# Patient Record
Sex: MALE | Race: WHITE
[De-identification: names, ages, dates, MRNs, and addresses within clinical notes are randomized per-mention and may not be internally consistent; named-entity substitution may affect disease eponyms.]

---

## 2018-05-25 ENCOUNTER — HOSPITAL ENCOUNTER (OUTPATIENT)
Dept: HOSPITAL 75 - RAD | Age: 78
End: 2018-05-25
Attending: UROLOGY
Payer: MEDICARE

## 2018-05-25 DIAGNOSIS — K80.20: ICD-10-CM

## 2018-05-25 DIAGNOSIS — N20.0: Primary | ICD-10-CM

## 2018-05-25 DIAGNOSIS — K57.30: ICD-10-CM

## 2018-05-25 DIAGNOSIS — K40.90: ICD-10-CM

## 2018-05-25 PROCEDURE — 74176 CT ABD & PELVIS W/O CONTRAST: CPT

## 2018-05-25 NOTE — DIAGNOSTIC IMAGING REPORT
PROCEDURE: CT abdomen and pelvis without contrast.



TECHNIQUE: Multiple contiguous axial images were obtained through

the abdomen and pelvis without the use of intravenous contrast. 



INDICATION: Hematuria.



No prior studies are available for comparison.



The lung bases are clear.



No discrete liver mass is identified. There appears to be a very

small stone within the gallbladder. Pancreas and spleen are

unremarkable. No adrenal mass is detected. There is a 3 mm

nonobstructing calculus upper pole right kidney. Tiny cortical

calcification left kidney seen. There is no hydronephrosis. No

ureteral calculi or ureteral dilatation is detected. No bladder

calculi are identified. The aorta is heavily calcified but

nonaneurysmal. The small and large bowel loops are normal

caliber. The appendix is unremarkable. There is sigmoid

diverticulosis without evidence of acute diverticulitis. Prostate

is moderately enlarged. There is a small amount of gas within the

bladder, perhaps owing to recent instrumentation. There is a

fat-containing left inguinal hernia. Bony structures are

nonacute.



IMPRESSION:

1. Small nonobstructing right renal calculus. 

2. Cholelithiasis. 

3. Uncomplicated diverticulosis. 

4. Minimal gas within the bladder, perhaps owing to

instrumentation. Clinical correlation is recommended. 

5. Fat containing left inguinal hernia.



Dictated by: 



  Dictated on workstation # [id number]

## 2020-01-17 ENCOUNTER — HOSPITAL ENCOUNTER (OUTPATIENT)
Dept: HOSPITAL 75 - LAB | Age: 80
End: 2020-01-17
Attending: NURSE PRACTITIONER
Payer: MEDICARE

## 2020-01-17 DIAGNOSIS — I25.10: ICD-10-CM

## 2020-01-17 DIAGNOSIS — R53.83: ICD-10-CM

## 2020-01-17 DIAGNOSIS — R43.9: ICD-10-CM

## 2020-01-17 DIAGNOSIS — D64.9: Primary | ICD-10-CM

## 2020-01-17 LAB
ALBUMIN SERPL-MCNC: 3.8 GM/DL (ref 3.2–4.5)
ALP SERPL-CCNC: 89 U/L (ref 40–136)
ALT SERPL-CCNC: 104 U/L (ref 0–55)
BASOPHILS # BLD AUTO: 0 10^3/UL (ref 0–0.1)
BASOPHILS NFR BLD AUTO: 0 % (ref 0–10)
BILIRUB SERPL-MCNC: 0.3 MG/DL (ref 0.1–1)
BUN/CREAT SERPL: 11
CALCIUM SERPL-MCNC: 8.8 MG/DL (ref 8.5–10.1)
CHLORIDE SERPL-SCNC: 106 MMOL/L (ref 98–107)
CO2 SERPL-SCNC: 25 MMOL/L (ref 21–32)
CREAT SERPL-MCNC: 1.67 MG/DL (ref 0.6–1.3)
EOSINOPHIL # BLD AUTO: 0.1 10^3/UL (ref 0–0.3)
EOSINOPHIL NFR BLD AUTO: 1 % (ref 0–10)
ERYTHROCYTE [DISTWIDTH] IN BLOOD BY AUTOMATED COUNT: 22.5 % (ref 10–14.5)
GFR SERPLBLD BASED ON 1.73 SQ M-ARVRAT: 40 ML/MIN
GLUCOSE SERPL-MCNC: 97 MG/DL (ref 70–105)
HCT VFR BLD CALC: 39 % (ref 40–54)
HGB BLD-MCNC: 11.9 G/DL (ref 13.3–17.7)
LYMPHOCYTES # BLD AUTO: 1.8 X 10^3 (ref 1–4)
LYMPHOCYTES NFR BLD AUTO: 24 % (ref 12–44)
MANUAL DIFFERENTIAL PERFORMED BLD QL: NO
MCH RBC QN AUTO: 24 PG (ref 25–34)
MCHC RBC AUTO-ENTMCNC: 31 G/DL (ref 32–36)
MCV RBC AUTO: 79 FL (ref 80–99)
MONOCYTES # BLD AUTO: 0.8 X 10^3 (ref 0–1)
MONOCYTES NFR BLD AUTO: 11 % (ref 0–12)
NEUTROPHILS # BLD AUTO: 4.8 X 10^3 (ref 1.8–7.8)
NEUTROPHILS NFR BLD AUTO: 64 % (ref 42–75)
PLATELET # BLD: 274 10^3/UL (ref 130–400)
PMV BLD AUTO: 11.3 FL (ref 7.4–10.4)
POTASSIUM SERPL-SCNC: 4.8 MMOL/L (ref 3.6–5)
PROT SERPL-MCNC: 7.1 GM/DL (ref 6.4–8.2)
SODIUM SERPL-SCNC: 140 MMOL/L (ref 135–145)
WBC # BLD AUTO: 7.5 10^3/UL (ref 4.3–11)

## 2020-01-17 PROCEDURE — 36415 COLL VENOUS BLD VENIPUNCTURE: CPT

## 2020-01-17 PROCEDURE — 85379 FIBRIN DEGRADATION QUANT: CPT

## 2020-01-17 PROCEDURE — 86141 C-REACTIVE PROTEIN HS: CPT

## 2020-01-17 PROCEDURE — 80053 COMPREHEN METABOLIC PANEL: CPT

## 2020-01-17 PROCEDURE — 85025 COMPLETE CBC W/AUTO DIFF WBC: CPT

## 2020-01-17 PROCEDURE — 82728 ASSAY OF FERRITIN: CPT

## 2022-06-16 ENCOUNTER — HOSPITAL ENCOUNTER (EMERGENCY)
Dept: HOSPITAL 75 - ER | Age: 82
Discharge: ANOTHER HOSPITAL | End: 2022-06-16
Payer: MEDICARE

## 2022-06-16 VITALS — SYSTOLIC BLOOD PRESSURE: 134 MMHG | DIASTOLIC BLOOD PRESSURE: 64 MMHG

## 2022-06-16 VITALS — WEIGHT: 176.81 LBS | HEIGHT: 70 IN | BODY MASS INDEX: 25.31 KG/M2

## 2022-06-16 DIAGNOSIS — I11.0: ICD-10-CM

## 2022-06-16 DIAGNOSIS — K92.2: ICD-10-CM

## 2022-06-16 DIAGNOSIS — Z79.82: ICD-10-CM

## 2022-06-16 DIAGNOSIS — Z95.5: ICD-10-CM

## 2022-06-16 DIAGNOSIS — Z87.19: ICD-10-CM

## 2022-06-16 DIAGNOSIS — Z20.822: ICD-10-CM

## 2022-06-16 DIAGNOSIS — Z87.891: ICD-10-CM

## 2022-06-16 DIAGNOSIS — Z90.49: ICD-10-CM

## 2022-06-16 DIAGNOSIS — Z79.02: ICD-10-CM

## 2022-06-16 DIAGNOSIS — I48.20: ICD-10-CM

## 2022-06-16 DIAGNOSIS — I25.10: Primary | ICD-10-CM

## 2022-06-16 DIAGNOSIS — I50.9: ICD-10-CM

## 2022-06-16 LAB
ALBUMIN SERPL-MCNC: 3.2 GM/DL (ref 3.2–4.5)
ALP SERPL-CCNC: 64 U/L (ref 40–136)
ALT SERPL-CCNC: 19 U/L (ref 0–55)
AMYLASE SERPL-CCNC: 152 U/L (ref 25–125)
APTT BLD: 32 SEC (ref 24–35)
BASOPHILS # BLD AUTO: 0 10^3/UL (ref 0–0.1)
BASOPHILS NFR BLD AUTO: 1 % (ref 0–10)
BILIRUB SERPL-MCNC: 0.6 MG/DL (ref 0.1–1)
BUN/CREAT SERPL: 14
CALCIUM SERPL-MCNC: 8.2 MG/DL (ref 8.5–10.1)
CHLORIDE SERPL-SCNC: 103 MMOL/L (ref 98–107)
CK MB SERPL-MCNC: 1 NG/ML (ref ?–6.6)
CK SERPL-CCNC: 56 U/L (ref 30–200)
CO2 SERPL-SCNC: 25 MMOL/L (ref 21–32)
CREAT SERPL-MCNC: 2.1 MG/DL (ref 0.6–1.3)
EOSINOPHIL # BLD AUTO: 0.2 10^3/UL (ref 0–0.3)
EOSINOPHIL NFR BLD AUTO: 2 % (ref 0–10)
GFR SERPLBLD BASED ON 1.73 SQ M-ARVRAT: 31 ML/MIN
GLUCOSE SERPL-MCNC: 100 MG/DL (ref 70–105)
HCT VFR BLD CALC: 30 % (ref 40–54)
HGB BLD-MCNC: 9.8 G/DL (ref 13.3–17.7)
INR PPP: 1.2 (ref 0.8–1.4)
LIPASE SERPL-CCNC: 25 U/L (ref 8–78)
LYMPHOCYTES # BLD AUTO: 1.3 10^3/UL (ref 1–4)
LYMPHOCYTES NFR BLD AUTO: 18 % (ref 12–44)
MAGNESIUM SERPL-MCNC: 2.2 MG/DL (ref 1.6–2.4)
MANUAL DIFFERENTIAL PERFORMED BLD QL: NO
MCH RBC QN AUTO: 30 PG (ref 25–34)
MCHC RBC AUTO-ENTMCNC: 33 G/DL (ref 32–36)
MCV RBC AUTO: 89 FL (ref 80–99)
MONOCYTES # BLD AUTO: 0.9 10^3/UL (ref 0–1)
MONOCYTES NFR BLD AUTO: 13 % (ref 0–12)
NEUTROPHILS # BLD AUTO: 4.6 10^3/UL (ref 1.8–7.8)
NEUTROPHILS NFR BLD AUTO: 65 % (ref 42–75)
PLATELET # BLD: 226 10^3/UL (ref 130–400)
PMV BLD AUTO: 11.2 FL (ref 9–12.2)
POTASSIUM SERPL-SCNC: 3.5 MMOL/L (ref 3.6–5)
PROT SERPL-MCNC: 5.8 GM/DL (ref 6.4–8.2)
PROTHROMBIN TIME: 15.3 SEC (ref 12.2–14.7)
SODIUM SERPL-SCNC: 138 MMOL/L (ref 135–145)
WBC # BLD AUTO: 7 10^3/UL (ref 4.3–11)

## 2022-06-16 PROCEDURE — 85610 PROTHROMBIN TIME: CPT

## 2022-06-16 PROCEDURE — 87636 SARSCOV2 & INF A&B AMP PRB: CPT

## 2022-06-16 PROCEDURE — 84145 PROCALCITONIN (PCT): CPT

## 2022-06-16 PROCEDURE — 93041 RHYTHM ECG TRACING: CPT

## 2022-06-16 PROCEDURE — 85025 COMPLETE CBC W/AUTO DIFF WBC: CPT

## 2022-06-16 PROCEDURE — 85730 THROMBOPLASTIN TIME PARTIAL: CPT

## 2022-06-16 PROCEDURE — 36415 COLL VENOUS BLD VENIPUNCTURE: CPT

## 2022-06-16 PROCEDURE — 85652 RBC SED RATE AUTOMATED: CPT

## 2022-06-16 PROCEDURE — 86141 C-REACTIVE PROTEIN HS: CPT

## 2022-06-16 PROCEDURE — 82553 CREATINE MB FRACTION: CPT

## 2022-06-16 PROCEDURE — 93005 ELECTROCARDIOGRAM TRACING: CPT

## 2022-06-16 PROCEDURE — 82150 ASSAY OF AMYLASE: CPT

## 2022-06-16 PROCEDURE — 82550 ASSAY OF CK (CPK): CPT

## 2022-06-16 PROCEDURE — 83690 ASSAY OF LIPASE: CPT

## 2022-06-16 PROCEDURE — 83874 ASSAY OF MYOGLOBIN: CPT

## 2022-06-16 PROCEDURE — 80053 COMPREHEN METABOLIC PANEL: CPT

## 2022-06-16 PROCEDURE — 83880 ASSAY OF NATRIURETIC PEPTIDE: CPT

## 2022-06-16 PROCEDURE — 83735 ASSAY OF MAGNESIUM: CPT

## 2022-06-16 PROCEDURE — 85379 FIBRIN DEGRADATION QUANT: CPT

## 2022-06-16 PROCEDURE — 84484 ASSAY OF TROPONIN QUANT: CPT

## 2022-06-16 PROCEDURE — 71045 X-RAY EXAM CHEST 1 VIEW: CPT

## 2022-06-16 NOTE — DIAGNOSTIC IMAGING REPORT
INDICATION: CHF



FINDINGS: Single view of the chest demonstrates cardiac

enlargement with interstitial infiltrates. There is no

pneumothorax or large effusion. Pacemaker stable.



IMPRESSION: CHF



Dictated by: 



  Dictated on workstation # [id number]

## 2022-06-16 NOTE — ED CHEST PAIN
General


Stated Complaint:  CP


Source:  patient (PT IS POOR / LIMITED  HISTORIAN ABOUT PMH. ), EMS





History of Present Illness


Date Seen by Provider:  Jun 16, 2022


Time Seen by Provider:  04:02


Initial Comments


PT ARRIVES VIA [facility] FROM [place], BYPASSING [place] [facility] 


C/O CHEST PAIN THAT WOKE HIM UP AT 0230


PAIN IS IN RIGHT LOWER/LATERAL CHEST


NO RADIATION OF PAIN 


NOTHING WORSENS OR IMPROVES PAIN 


HAS NOT TAKEN ANYTHING FOR PAIN 


EMS GAVE 324 MG ASPIRIN. AND FENTANYL AND PAIN IS GONE.  


+ "COLD SWEATS" 


+ SHORTNESS OF BREATH


+ NAUSEA, NO VOMITING


NO SWELLING IN LEGS/ FEET OR PAIN IN CALVES


NO DIZZINESS OR SYNCOPE





PT HAS HAD MI X 3 AND STENTS X 7. LAST MI AND STENTS WERE 09/2021--DONE AT [facility]


WAS HOSPITALIZED IN APRIL 2022 FOR CHF


STATES THESE SYMPTOMS ARE THE SAME AS WITH PRIOR HEART ATTACKS. 





PT HAS CHRONIC ATRIAL FIBRILLATION AND IS PRESCRIBED PLAVIX AND ASPIRIN





PT DEVELOPED DIVERTICULITIS, AND WAS PRESCRIBED CIPRO AND FLAGYL 05/31/21, AND 

HAS BEEN OFF HIS PLAVIX  AND ASPIRIN SINCE FRIDAY 06/12/22IN ORDER TO HAVE A 

COLONOSCOPY NEXT WEDNESDAY. 


HAS FREQUENT PROBLEMS WITH GI BLEEDING, AND IS CURRENTLY HAVING BLACK STOOLS. 





PT DENIES COUGH OR FEVER


PT HAS COPD BUT DOES NOT HAVE HOME O2


PT QUIT SMOKING IN 2000, AND QUIT ALCOHOL USE ABOUT THAT TIME AS WELL





PT HAS NOT HAD COVID OR FLU  VACCINES





NO PRIOR VISITS HERE





PCP: DR. MELANIE ALVARENGA WITH [facility] IN [place]


CARDIOLOGIST: DR. OSORIO WITH [facility]





Allergies and Home Medications


Allergies


Coded Allergies:  


     No Known Drug Allergies (Unverified , 6/16/22)





Patient Home Medication List


Albuterol Sulfate (Ventolin Hfa) 1 Puff Puff, (Reported)


   Entered as Reported by: AURA FISH on 6/16/22 0410


   Last Action: New Order


Carvedilol (Carvedilol) 12.5 Mg Tablet, (Reported)


   Entered as Reported by: AURA FISH on 6/16/22 0410


   Last Action: New Order


Clopidogrel Bisulfate (Clopidogrel) 75 Mg Tablet, (Reported)


   Entered as Reported by: AURA FISH on 6/16/22 0410


   Last Action: New Order


Levothyroxine Sodium (Levothyroxine Sodium) 88 Mcg Tablet, (Reported)


   Entered as Reported by: AURA FISH on 6/16/22 0410


   Last Action: New Order


Lisinopril (Lisinopril) 2.5 Mg Tablet, (Reported)


   Entered as Reported by: AURA FISH on 6/16/22 0410


   Last Action: New Order


Potassium Chloride (K-Tab ER) 10 Meq Tablet.er, (Reported)


   Entered as Reported by: AURA FISH on 6/16/22 0410


   Last Action: New Order


Torsemide (Torsemide) 20 Mg Tablet, (Reported)


   Entered as Reported by: AURA FISH on 6/16/22 0410


   Last Action: New Order





Review of Systems


Review of Systems


Constitutional:  see HPI, diaphoresis


EENTM:  No Symptoms Reported


Respiratory:  See HPI, Shortness of Air


Cardiovascular:  See HPI, Chest Pain; Denies Edema; Irregular Heart Rate (PT HAS

CHRONIC ATRIAL FIBRILLATION); Denies Lightheadedness, Denies Palpitations; 

Syncope


Gastrointestinal:  See HPI; Denies Abdominal Pain; Nausea, Rectal Bleeding 

(BLACK STOOLS); Denies Vomiting; Other (RECENT DIVERTICULITIS, NO COMPLAINTS 

REGARDING THAT ILLNESS AT THIS TIME. )


Genitourinary:  No Symptoms Reported


Musculoskeletal:  no symptoms reported


Skin:  no symptoms reported


Psychiatric/Neurological:  No Symptoms Reported


Endocrine:  No Symptoms Reported


Hematologic/Lymphatic:  No Symptoms Reported





Past Medical-Social-Family Hx


Patient Social History


Tobacco Use?:  Yes


Tobacco type used:  Cigarettes


Smoking Status:  Former Smoker


Substance use?:  No


Alcohol Use?:  Yes





Past Medical History


Surgeries:  Yes


Abdominal, Bowel Surgery, Cardiac, Coronary Stent, Pacemaker


Respiratory:  Yes


COPD


Cardiac:  Yes (PACEMAKER; STENTS X 7)


Atrial Fibrillation, Coronary Artery Disease, Heart Attack, High Cholesterol, 

Hypertension


Neurological:  No


Genitourinary:  No


Gastrointestinal:  Yes (COLON RESECTION FOR DIVERTICULITIS)


Gastroesophageal Reflux, Chronic Constipation, Diverticulosis


Musculoskeletal:  No


Endocrine:  Yes


Hypothyroidsim


HEENT:  No


Cancer:  No


Psychosocial:  No


Integumentary:  No


Blood Disorders:  No





Family Medical History








SOCIAL HISTORY:


-SMOKED 1 PPD, QUIT 2000


-ETOH--HISTORY OF USE, QUIT AROUND 2000


-DENIES DRUG USE





PAST SURGICAL HISTORY:


-COLON RESECTION FOR DIVERTICULITIS


-CARDIAC CATHS WITH STENTS X 7--LAST ONE 09/2021, PER PT-DONE AT [facility]





Physical Exam


Vital Signs





Vital Signs - First Documented




















Capillary Refill :


Height, Weight, BMI


Height: '"


Weight: lbs. oz. kg;  BMI


Method:


General Appearance:  No Apparent Distress, WD/WN


Neck:  Full Range of Motion, Normal Inspection, Non Tender, Supple; No Carotid 

Bruit, No JVD


Respiratory:  Chest Non Tender, Normal Breath Sounds, No Accessory Muscle Use, 

No Respiratory Distress


Cardiovascular:  No Edema, No JVD, No Murmur, Normal Peripheral Pulses, 

Irregularly Irregular


Gastrointestinal:  Normal Bowel Sounds, No Organomegaly, No Pulsatile Mass, Non 

Tender, Soft


Extremity:  Normal Capillary Refill, Normal Inspection, Normal Range of Motion, 

Non Tender, No Calf Tenderness, No Pedal Edema


Neurologic/Psychiatric:  Alert, Oriented x3, No Motor/Sensory Deficits, Normal 

Mood/Affect, CNs II-XII Norm as Tested


Skin:  Normal Color





Progress/Results/Core Measures


Results/Orders


Lab Results





Laboratory Tests








Test


 6/16/22


04:10 6/16/22


04:19 Range/Units


 


 


White Blood Count


 7.0 


 


 4.3-11.0


10^3/uL


 


Red Blood Count


 3.31 L


 


 4.30-5.52


10^6/uL


 


Hemoglobin 9.8 L  13.3-17.7  g/dL


 


Hematocrit 30 L  40-54  %


 


Mean Corpuscular Volume 89   80-99  fL


 


Mean Corpuscular Hemoglobin 30   25-34  pg


 


Mean Corpuscular Hemoglobin


Concent 33 


 


 32-36  g/dL





 


Red Cell Distribution Width 14.6 H  10.0-14.5  %


 


Platelet Count


 226 


 


 130-400


10^3/uL


 


Mean Platelet Volume 11.2   9.0-12.2  fL


 


Immature Granulocyte % (Auto) 0    %


 


Neutrophils (%) (Auto) 65   42-75  %


 


Lymphocytes (%) (Auto) 18   12-44  %


 


Monocytes (%) (Auto) 13 H  0-12  %


 


Eosinophils (%) (Auto) 2   0-10  %


 


Basophils (%) (Auto) 1   0-10  %


 


Neutrophils # (Auto)


 4.6 


 


 1.8-7.8


10^3/uL


 


Lymphocytes # (Auto)


 1.3 


 


 1.0-4.0


10^3/uL


 


Monocytes # (Auto)


 0.9 


 


 0.0-1.0


10^3/uL


 


Eosinophils # (Auto)


 0.2 


 


 0.0-0.3


10^3/uL


 


Basophils # (Auto)


 0.0 


 


 0.0-0.1


10^3/uL


 


Immature Granulocyte # (Auto)


 0.0 


 


 0.0-0.1


10^3/uL


 


Erythrocyte Sedimentation Rate 23   0-30  MM/HR


 


Prothrombin Time 15.3 H  12.2-14.7  SEC


 


INR Comment 1.2   0.8-1.4  


 


Activated Partial


Thromboplast Time 32 


 


 24-35  SEC





 


D-Dimer


 1.19 H


 


 0.00-0.49


UG/ML


 


Sodium Level 138   135-145  MMOL/L


 


Potassium Level 3.5 L  3.6-5.0  MMOL/L


 


Chloride Level 103     MMOL/L


 


Carbon Dioxide Level 25   21-32  MMOL/L


 


Anion Gap 10   5-14  MMOL/L


 


Blood Urea Nitrogen 30 H  7-18  MG/DL


 


Creatinine


 2.10 H


 


 0.60-1.30


MG/DL


 


Estimat Glomerular Filtration


Rate 31 


 


  





 


BUN/Creatinine Ratio 14    


 


Glucose Level 100     MG/DL


 


Calcium Level 8.2 L  8.5-10.1  MG/DL


 


Corrected Calcium 8.8   8.5-10.1  MG/DL


 


Magnesium Level 2.2   1.6-2.4  MG/DL


 


Total Bilirubin 0.6   0.1-1.0  MG/DL


 


Aspartate Amino Transf


(AST/SGOT) 36 H


 


 5-34  U/L





 


Alanine Aminotransferase


(ALT/SGPT) 19 


 


 0-55  U/L





 


Alkaline Phosphatase 64     U/L


 


Total Creatine Kinase 56     U/L


 


Creatine Kinase MB 1.0   <6.6  NG/ML


 


Myoglobin


 81.3 


 


 10.0-92.0


NG/ML


 


Troponin I < 0.028   <0.028  NG/ML


 


C-Reactive Protein High


Sensitivity 0.62 H


 


 0.00-0.50


MG/DL


 


B-Type Natriuretic Peptide 1176.9 H  <100.0  PG/ML


 


Total Protein 5.8 L  6.4-8.2  GM/DL


 


Albumin 3.2   3.2-4.5  GM/DL


 


Amylase Level 152 H    U/L


 


Lipase 25   8-78  U/L


 


Procalcitonin 0.05   <0.10  NG/ML


 


Influenza Type A (RT-PCR)  Not Detected  Not Detecte  


 


Influenza Type B (RT-PCR)  Not Detected  Not Detecte  


 


SARS-CoV-2 RNA (RT-PCR)  Not Detected  Not Detecte  








My Orders





Orders - WIL DENNIS DO


Ekg Tracing (6/16/22 03:59)


Cbc With Automated Diff (6/16/22 04:00)


Magnesium (6/16/22 04:00)


Chest 1 View, Ap/Pa Only (6/16/22 04:00)


Ekg Tracing (6/16/22 04:00)


Comprehensive Metabolic Panel (6/16/22 04:00)


Myoglobin Serum (6/16/22 04:00)


Protime With Inr (6/16/22 04:00)


Partial Thromboplastin Time (6/16/22 04:00)


O2 (6/16/22 04:00)


Monitor-Rhythm Ecg Trace Only (6/16/22 04:00)


Ed Iv/Invasive Line Start (6/16/22 04:00)


Creatine Kinase (6/16/22 04:00)


Creatine Kinase Mb (6/16/22 04:00)


Lipase (6/16/22 04:00)


Amylase (6/16/22 04:00)


Bnp Patricia (6/16/22 04:00)


Troponin I Reagan (6/16/22 04:00)


Fibrin Degradation Products (6/16/22 04:15)


Procalcitonin (Pct) (6/16/22 04:15)


Hs C Reactive Protein (6/16/22 04:15)


Erythrocyte Sedimentation Rate (6/16/22 04:15)


Covid 19 Inhouse Test (6/16/22 04:15)


Influenza A And B By Pcr (6/16/22 04:15)


Isolation Central Supply Req (6/16/22 04:15)


Furosemide  Injection (Lasix  Injection) (6/16/22 05:15)





Medications Given in ED





Current Medications








 Medications  Dose


 Ordered  Sig/Alec


 Route  Start Time


 Stop Time Status Last Admin


Dose Admin


 


 Furosemide  80 mg  ONCE  ONCE


 IVP  6/16/22 05:15


 6/16/22 05:16 DC 6/16/22 05:15


80 MG








Vital Signs/I&O











 6/16/22 6/16/22





 04:01 04:01


 


Temp 35.7 


 


Pulse 86 


 


Resp 16 


 


B/P (MAP) 110/76 (87) 


 


Pulse Ox 99 99


 


O2 Delivery Nasal Cannula Nasal Cannula


 


O2 Flow Rate 2.00 2.00











Progress


Progress Note :  


Progress Note


NO CHEST PAIN OR DYSPNEA OR SWEATS OR NAUSEA DURING ER STAY





GIVEN LASIX FOR CHF. 





VITALS STABLE


NO HYPOXIA


NO FEVER


NO HYPOTENSION


NO TACHYCARDIA


DURING ER STAY





0600--CARE TURNED OVER TO DR. MIGUEL, TRANSFER IS PENDING--PT HAS BEEN 

ACCEPTED AND IS AWAITING EMS TRANSPORTATION


Initial ECG Impression Date:  Jun 16, 2022


Initial ECG Impression Time:  04:05


Initial ECG Rate:  89


Initial ECG Rhythm:  A Fib/Flutter (WITH PVC'S. LOW VOLTAGE)


Initial ECG Impression:  Nonspecific Changes (INFERIOR AND ANTERIOR Q WAVES)


Initial ECG Comparisson:  No Previous ECG Available





Diagnostic Imaging





Comments


CXR--CHF, CARDIOMEGALY--PENDING RADIOLOGIST REVIEW


   Reviewed:  Reviewed by Me





Departure


Communication (Admissions)





0518--CALLED [facility] PT'S REQUEST. THEY WILL CALL BACK. 


0540--SPOKE WITH DR. BRANHAM ( ? SPELLING ? ), HOSPITALIST, ACCEPTS PT FOR ADMIT.





Impression





   Primary Impression:  


   Chest pain


   Additional Impressions:  


   CHF (congestive heart failure)


   RECENT GI BLEEDING


   CAD WITH MULTIPLE STENTS


   Chronic atrial fibrillation


Disposition:  02 XFER SHT-TRM HOSP


Condition:  Stable





Transfer


Transfer Reason:  Patient preference


Transfer Facility:  


[facility]


[facility]


Method of Transfer:  EMS





Departure-Patient Inst.


Referrals:  


EMERSON PICKETT MD (PCP/Family)


Primary Care Physician











WIL DENNIS DO                 Jun 16, 2022 04:03